# Patient Record
Sex: FEMALE | Race: WHITE
[De-identification: names, ages, dates, MRNs, and addresses within clinical notes are randomized per-mention and may not be internally consistent; named-entity substitution may affect disease eponyms.]

---

## 2016-06-18 VITALS
DIASTOLIC BLOOD PRESSURE: 77 MMHG | SYSTOLIC BLOOD PRESSURE: 110 MMHG | DIASTOLIC BLOOD PRESSURE: 77 MMHG | SYSTOLIC BLOOD PRESSURE: 110 MMHG

## 2017-11-30 ENCOUNTER — HOSPITAL ENCOUNTER (OUTPATIENT)
Dept: HOSPITAL 24 - RAD | Age: 42
End: 2017-11-30
Attending: INTERNAL MEDICINE
Payer: COMMERCIAL

## 2017-11-30 DIAGNOSIS — Z12.31: Primary | ICD-10-CM

## 2017-11-30 PROCEDURE — 77067 SCR MAMMO BI INCL CAD: CPT

## 2017-12-04 NOTE — MG
HISTORY:  SCREENING 



Comparison: 12/03/2015



FINDINGS: 

Bilateral CC and MLO projections of the right and left breast were obtained.  Scattered fibroglandula
r tissue is seen to be present.  No significant architectural distortion, mass or clustered microcalc
ifications can be observed to suggest malignancy.  No skin thickening or nipple retraction is appreci
ated.   No pathological lymphadenopathy can be identified. 

 

IMPRESSION:  NO RADIOGRAPHIC EVIDENCE OF MALIGNANCY.  

 

ACR CATEGORY I - NEGATIVE EXAM.

 

FOLLOW-UP EXAM 1 YEAR. 



Diagnostic CAD was utilized and reviewed.



* 0 (ZERO) - ASSESSMENT INCOMPLETE; ADDITIONAL IMAGING IS NEEDED. 

* 1/1 (ONE) - NEGATIVE. 

* 2/II (TWO) - BENIGN FINDINGS.

 * 3/III (THREE) - PROBABLY BENIGN FINDING; SHORT INTERVAL FOLLOW-UP

SUGGESTED.

 * 4/IV (FOUR) - SUSPICIOUS ABNORMALITY; BIOPSY SHOULD BE CONSIDERED.

 * 5/V - HIGHLY SUSPICIOUS OF MALIGNANCY; BIOPSY SHOULD BE PERFORMED.

 

A NEGATIVE X-RAY REPORT SHOULD NOT DELAY BIOPSY IF A DOMINANT OR

CLINICALLY SUSPICIOUS MASS IS PRESENT; 4 TO 8 PERCENT OF CANCERS ARE NOT IDENTIFIED BY X-RAY.  A NEGA
TIVE REPORT MAY REINFORCE THE CLINICAL IMPRESSION.  ADENOSIS AND DENSE BREASTS MAY OBSCURE AN UNDERLY
ING NEOPLASM.







Reported By:Electronically Signed by LISA NIEVES MD at 12/4/2017 10:58:49 AM

## 2018-02-22 ENCOUNTER — HOSPITAL ENCOUNTER (OUTPATIENT)
Dept: HOSPITAL 24 - RAD | Age: 43
End: 2018-02-22
Attending: INTERNAL MEDICINE
Payer: COMMERCIAL

## 2018-02-22 DIAGNOSIS — R91.1: Primary | ICD-10-CM

## 2018-02-22 PROCEDURE — 71260 CT THORAX DX C+: CPT

## 2018-02-22 NOTE — CT
HISTORY:  Solitary pulmonary nodule



Study:  Computed tomography of the chest:  Multiple axial images were obtained throughout chest after
 the injection of intravascular contrast per standard protocol.  Radiation dose reduction techniques 
utilized



Comparison:  01/18/2017



Findings:  



There is a tiny nodule in the lateral aspect of right upper lobe measuring approximately 3 mm in maxi
mum dimension, unchanged.  A small pleural-based lesion is present in minor fissure on the right maurizio
uring approximately 5 mm in maximum length, unchanged.  A tiny nodule is present in the left upper lo
be measuring approximately 4 mm in maximum dimension, unchanged.  There is a well-defined pulmonary n
odule in the left lower lobe laterally measuring approximately 7 mm in maximum dimension, unchanged. 
 There are few scattered tiny pleural-based densities felt to most likely be due to small areas of sc
arring.



The thyroid is mildly heterogeneous without a well-defined nodule.  I see no evidence of supraclavicu
lar adenopathy or axillary adenopathy.  A small mediastinal lymph node is present on the left measuri
ng approximately 8 mm in maximum dimension, unchanged.  A couple of small right paratracheal lymph no
katie are present, unchanged.  Small lymph nodes are present in the anterior mediastinum, unchanged .  
A small right paratracheal lymph node present measuring approximately 15 mm in maximum dimension, not
 felt to be changed from the prior examination.  Similar finding is noted in the left pulmonary hilum
 with a lymph node measuring approximately 12 mm in maximum dimension.  A small right sided hilar lym
ph node is present inferiorly measuring approximately 17 mm in maximum dimension, not felt to be reed
ged.  Otherwise, minimal lymphoid tissue is present within the hilar regions.  The aorta is normal it
s appearance.  An aberrant right subclavian artery is noted.  The right common carotid artery arises 
from a common trunk with left common carotid artery.  These are anatomic variants.  The remainder of 
the aorta is normal.  The pulmonary artery and its major branches are free of intraluminal filling de
fects.  The heart size is normal.  No appreciable pericardial effusion is noted



The visualized abdomen shows a heterogeneous appearance to the liver and spleen, felt to most likely 
be to the phase of the contrast injection.  The gallbladder is contracted.  No appreciable biliary du
ct dilatation is noted.  The pancreas is normal in its appearance.  The adrenal glands are within nor
mal limits.  The kidneys demonstrate what appears to most likely be focal scarring in the lower pole 
on the right with a nonobstructing 1-2 mm calculus.  The left kidney is normal.  No evidence of retro
peritoneal lymph node enlargement or abdominal aortic aneurysm is noted.  A moderate to large amount 
of stool is present in visualized colon including the ascending, transverse and descending colon.  Th
e visualized small bowel is normal.  I see no evidence mesenteric adenopathy.



Examination of the bone windows is demonstrate no significant spondylosis.  Port



IMPRESSION:

1.  Stable appearance of the pulmonary nodules as described above.  Additional six-month follow-up re
commended.

2.  Nonobstructing right renal calculus.

3. Contracted gallbladder of unknown clinical significance.

4.  Moderate to large amount of stool within the visualized colon.

5.  Aberrant right subclavian artery.







Reported By:Electronically Signed by KASSIDY VELEZ MD at 2/22/2018 8:12:33 AM